# Patient Record
Sex: FEMALE | Race: OTHER | NOT HISPANIC OR LATINO
[De-identification: names, ages, dates, MRNs, and addresses within clinical notes are randomized per-mention and may not be internally consistent; named-entity substitution may affect disease eponyms.]

---

## 2020-11-14 RX ORDER — AZITHROMYCIN 500 MG/1
250 TABLET, FILM COATED ORAL
Qty: 0 | Refills: 0 | DISCHARGE
Start: 2020-11-14

## 2020-11-17 ENCOUNTER — TRANSCRIPTION ENCOUNTER (OUTPATIENT)
Age: 85
End: 2020-11-17

## 2020-11-17 ENCOUNTER — INPATIENT (INPATIENT)
Facility: HOSPITAL | Age: 85
LOS: 1 days | Discharge: HOME | DRG: 178 | End: 2020-11-19
Attending: INTERNAL MEDICINE | Admitting: INTERNAL MEDICINE
Payer: MEDICARE

## 2020-11-17 VITALS
WEIGHT: 160.06 LBS | OXYGEN SATURATION: 96 % | HEART RATE: 59 BPM | SYSTOLIC BLOOD PRESSURE: 152 MMHG | RESPIRATION RATE: 20 BRPM | HEIGHT: 69 IN | DIASTOLIC BLOOD PRESSURE: 75 MMHG | TEMPERATURE: 98 F

## 2020-11-17 DIAGNOSIS — E87.2 ACIDOSIS: ICD-10-CM

## 2020-11-17 DIAGNOSIS — U07.1 COVID-19: ICD-10-CM

## 2020-11-17 DIAGNOSIS — I26.99 OTHER PULMONARY EMBOLISM WITHOUT ACUTE COR PULMONALE: ICD-10-CM

## 2020-11-17 DIAGNOSIS — I25.10 ATHEROSCLEROTIC HEART DISEASE OF NATIVE CORONARY ARTERY WITHOUT ANGINA PECTORIS: ICD-10-CM

## 2020-11-17 DIAGNOSIS — M10.9 GOUT, UNSPECIFIED: ICD-10-CM

## 2020-11-17 DIAGNOSIS — Z29.9 ENCOUNTER FOR PROPHYLACTIC MEASURES, UNSPECIFIED: ICD-10-CM

## 2020-11-17 DIAGNOSIS — I10 ESSENTIAL (PRIMARY) HYPERTENSION: ICD-10-CM

## 2020-11-17 DIAGNOSIS — N17.9 ACUTE KIDNEY FAILURE, UNSPECIFIED: ICD-10-CM

## 2020-11-17 DIAGNOSIS — E78.5 HYPERLIPIDEMIA, UNSPECIFIED: ICD-10-CM

## 2020-11-17 LAB
ALBUMIN SERPL ELPH-MCNC: 4 G/DL — SIGNIFICANT CHANGE UP (ref 3.3–5)
ALP SERPL-CCNC: 61 U/L — SIGNIFICANT CHANGE UP (ref 40–120)
ALT FLD-CCNC: 23 U/L — SIGNIFICANT CHANGE UP (ref 10–45)
ANION GAP SERPL CALC-SCNC: 15 MMOL/L — SIGNIFICANT CHANGE UP (ref 5–17)
APTT BLD: 30.8 SEC — SIGNIFICANT CHANGE UP (ref 27.5–35.5)
AST SERPL-CCNC: 44 U/L — HIGH (ref 10–40)
BASE EXCESS BLDV CALC-SCNC: -1.9 MMOL/L — SIGNIFICANT CHANGE UP
BASOPHILS # BLD AUTO: 0.01 K/UL — SIGNIFICANT CHANGE UP (ref 0–0.2)
BASOPHILS NFR BLD AUTO: 0.2 % — SIGNIFICANT CHANGE UP (ref 0–2)
BILIRUB SERPL-MCNC: 0.4 MG/DL — SIGNIFICANT CHANGE UP (ref 0.2–1.2)
BUN SERPL-MCNC: 19 MG/DL — SIGNIFICANT CHANGE UP (ref 7–23)
CA-I SERPL-SCNC: 1.16 MMOL/L — SIGNIFICANT CHANGE UP (ref 1.12–1.3)
CALCIUM SERPL-MCNC: 9.5 MG/DL — SIGNIFICANT CHANGE UP (ref 8.4–10.5)
CHLORIDE SERPL-SCNC: 102 MMOL/L — SIGNIFICANT CHANGE UP (ref 96–108)
CO2 SERPL-SCNC: 20 MMOL/L — LOW (ref 22–31)
CREAT ?TM UR-MCNC: 56 MG/DL — SIGNIFICANT CHANGE UP
CREAT SERPL-MCNC: 1.34 MG/DL — HIGH (ref 0.5–1.3)
CRP SERPL-MCNC: 1.52 MG/DL — HIGH (ref 0–0.4)
D DIMER BLD IA.RAPID-MCNC: 490 NG/ML DDU — HIGH
EOSINOPHIL # BLD AUTO: 0 K/UL — SIGNIFICANT CHANGE UP (ref 0–0.5)
EOSINOPHIL NFR BLD AUTO: 0 % — SIGNIFICANT CHANGE UP (ref 0–6)
FERRITIN SERPL-MCNC: 308 NG/ML — HIGH (ref 15–150)
GAS PNL BLDV: 134 MMOL/L — LOW (ref 138–146)
GAS PNL BLDV: SIGNIFICANT CHANGE UP
GLUCOSE SERPL-MCNC: 100 MG/DL — HIGH (ref 70–99)
HCO3 BLDV-SCNC: 23 MMOL/L — SIGNIFICANT CHANGE UP (ref 20–27)
HCT VFR BLD CALC: 41.4 % — SIGNIFICANT CHANGE UP (ref 34.5–45)
HGB BLD-MCNC: 13.4 G/DL — SIGNIFICANT CHANGE UP (ref 11.5–15.5)
IMM GRANULOCYTES NFR BLD AUTO: 0.9 % — SIGNIFICANT CHANGE UP (ref 0–1.5)
INR BLD: 0.97 — SIGNIFICANT CHANGE UP (ref 0.88–1.16)
LACTATE SERPL-SCNC: 1.2 MMOL/L — SIGNIFICANT CHANGE UP (ref 0.5–2)
LYMPHOCYTES # BLD AUTO: 1.42 K/UL — SIGNIFICANT CHANGE UP (ref 1–3.3)
LYMPHOCYTES # BLD AUTO: 31.5 % — SIGNIFICANT CHANGE UP (ref 13–44)
MCHC RBC-ENTMCNC: 27.2 PG — SIGNIFICANT CHANGE UP (ref 27–34)
MCHC RBC-ENTMCNC: 32.4 GM/DL — SIGNIFICANT CHANGE UP (ref 32–36)
MCV RBC AUTO: 84.1 FL — SIGNIFICANT CHANGE UP (ref 80–100)
MONOCYTES # BLD AUTO: 0.49 K/UL — SIGNIFICANT CHANGE UP (ref 0–0.9)
MONOCYTES NFR BLD AUTO: 10.9 % — SIGNIFICANT CHANGE UP (ref 2–14)
NEUTROPHILS # BLD AUTO: 2.55 K/UL — SIGNIFICANT CHANGE UP (ref 1.8–7.4)
NEUTROPHILS NFR BLD AUTO: 56.5 % — SIGNIFICANT CHANGE UP (ref 43–77)
NRBC # BLD: 0 /100 WBCS — SIGNIFICANT CHANGE UP (ref 0–0)
PCO2 BLDV: 39 MMHG — LOW (ref 41–51)
PH BLDV: 7.38 — SIGNIFICANT CHANGE UP (ref 7.32–7.43)
PLATELET # BLD AUTO: 133 K/UL — LOW (ref 150–400)
PO2 BLDV: 36 MMHG — SIGNIFICANT CHANGE UP
POTASSIUM BLDV-SCNC: 3.7 MMOL/L — SIGNIFICANT CHANGE UP (ref 3.5–4.9)
POTASSIUM SERPL-MCNC: 4.2 MMOL/L — SIGNIFICANT CHANGE UP (ref 3.5–5.3)
POTASSIUM SERPL-SCNC: 4.2 MMOL/L — SIGNIFICANT CHANGE UP (ref 3.5–5.3)
PROT SERPL-MCNC: 7.9 G/DL — SIGNIFICANT CHANGE UP (ref 6–8.3)
PROTHROM AB SERPL-ACNC: 11.6 SEC — SIGNIFICANT CHANGE UP (ref 10.6–13.6)
RBC # BLD: 4.92 M/UL — SIGNIFICANT CHANGE UP (ref 3.8–5.2)
RBC # FLD: 13.6 % — SIGNIFICANT CHANGE UP (ref 10.3–14.5)
SAO2 % BLDV: 64 % — SIGNIFICANT CHANGE UP
SARS-COV-2 RNA SPEC QL NAA+PROBE: DETECTED
SODIUM SERPL-SCNC: 137 MMOL/L — SIGNIFICANT CHANGE UP (ref 135–145)
SODIUM UR-SCNC: 69 MMOL/L — SIGNIFICANT CHANGE UP
WBC # BLD: 4.51 K/UL — SIGNIFICANT CHANGE UP (ref 3.8–10.5)
WBC # FLD AUTO: 4.51 K/UL — SIGNIFICANT CHANGE UP (ref 3.8–10.5)

## 2020-11-17 PROCEDURE — 93010 ELECTROCARDIOGRAM REPORT: CPT

## 2020-11-17 PROCEDURE — 99285 EMERGENCY DEPT VISIT HI MDM: CPT | Mod: CS

## 2020-11-17 PROCEDURE — 71275 CT ANGIOGRAPHY CHEST: CPT | Mod: 26

## 2020-11-17 PROCEDURE — 99223 1ST HOSP IP/OBS HIGH 75: CPT | Mod: CS,GC,AI

## 2020-11-17 PROCEDURE — 71045 X-RAY EXAM CHEST 1 VIEW: CPT | Mod: 26

## 2020-11-17 RX ORDER — ALLOPURINOL 300 MG
100 TABLET ORAL DAILY
Refills: 0 | Status: DISCONTINUED | OUTPATIENT
Start: 2020-11-17 | End: 2020-11-19

## 2020-11-17 RX ORDER — INFLUENZA VIRUS VACCINE 15; 15; 15; 15 UG/.5ML; UG/.5ML; UG/.5ML; UG/.5ML
0.7 SUSPENSION INTRAMUSCULAR ONCE
Refills: 0 | Status: DISCONTINUED | OUTPATIENT
Start: 2020-11-17 | End: 2020-11-19

## 2020-11-17 RX ORDER — ASPIRIN/CALCIUM CARB/MAGNESIUM 324 MG
1 TABLET ORAL
Qty: 0 | Refills: 0 | DISCHARGE
Start: 2020-11-17

## 2020-11-17 RX ORDER — AMLODIPINE BESYLATE 2.5 MG/1
5 TABLET ORAL DAILY
Refills: 0 | Status: DISCONTINUED | OUTPATIENT
Start: 2020-11-17 | End: 2020-11-19

## 2020-11-17 RX ORDER — ROSUVASTATIN CALCIUM 5 MG/1
1 TABLET ORAL
Qty: 0 | Refills: 0 | DISCHARGE

## 2020-11-17 RX ORDER — ATORVASTATIN CALCIUM 80 MG/1
20 TABLET, FILM COATED ORAL AT BEDTIME
Refills: 0 | Status: DISCONTINUED | OUTPATIENT
Start: 2020-11-17 | End: 2020-11-19

## 2020-11-17 RX ORDER — INFLUENZA VIRUS VACCINE 15; 15; 15; 15 UG/.5ML; UG/.5ML; UG/.5ML; UG/.5ML
0.5 SUSPENSION INTRAMUSCULAR ONCE
Refills: 0 | Status: DISCONTINUED | OUTPATIENT
Start: 2020-11-17 | End: 2020-11-17

## 2020-11-17 RX ORDER — DEXAMETHASONE 0.5 MG/5ML
8 ELIXIR ORAL ONCE
Refills: 0 | Status: COMPLETED | OUTPATIENT
Start: 2020-11-17 | End: 2020-11-17

## 2020-11-17 RX ORDER — IPRATROPIUM BROMIDE 21 MCG
2 AEROSOL, SPRAY (ML) NASAL
Qty: 0 | Refills: 0 | DISCHARGE

## 2020-11-17 RX ORDER — AMLODIPINE BESYLATE 2.5 MG/1
1 TABLET ORAL
Qty: 0 | Refills: 0 | DISCHARGE

## 2020-11-17 RX ORDER — ENOXAPARIN SODIUM 100 MG/ML
40 INJECTION SUBCUTANEOUS EVERY 24 HOURS
Refills: 0 | Status: DISCONTINUED | OUTPATIENT
Start: 2020-11-17 | End: 2020-11-19

## 2020-11-17 RX ORDER — SODIUM CHLORIDE 9 MG/ML
500 INJECTION INTRAMUSCULAR; INTRAVENOUS; SUBCUTANEOUS ONCE
Refills: 0 | Status: COMPLETED | OUTPATIENT
Start: 2020-11-17 | End: 2020-11-17

## 2020-11-17 RX ORDER — ZOLPIDEM TARTRATE 10 MG/1
1 TABLET ORAL
Qty: 0 | Refills: 0 | DISCHARGE

## 2020-11-17 RX ORDER — ASPIRIN/CALCIUM CARB/MAGNESIUM 324 MG
81 TABLET ORAL DAILY
Refills: 0 | Status: DISCONTINUED | OUTPATIENT
Start: 2020-11-17 | End: 2020-11-19

## 2020-11-17 RX ORDER — ALLOPURINOL 300 MG
100 TABLET ORAL
Qty: 0 | Refills: 0 | DISCHARGE

## 2020-11-17 RX ADMIN — ATORVASTATIN CALCIUM 20 MILLIGRAM(S): 80 TABLET, FILM COATED ORAL at 22:29

## 2020-11-17 RX ADMIN — AMLODIPINE BESYLATE 5 MILLIGRAM(S): 2.5 TABLET ORAL at 12:11

## 2020-11-17 RX ADMIN — Medication 100 MILLIGRAM(S): at 12:11

## 2020-11-17 RX ADMIN — SODIUM CHLORIDE 500 MILLILITER(S): 9 INJECTION INTRAMUSCULAR; INTRAVENOUS; SUBCUTANEOUS at 10:17

## 2020-11-17 RX ADMIN — ENOXAPARIN SODIUM 40 MILLIGRAM(S): 100 INJECTION SUBCUTANEOUS at 12:11

## 2020-11-17 RX ADMIN — Medication 81 MILLIGRAM(S): at 19:55

## 2020-11-17 RX ADMIN — Medication 101.6 MILLIGRAM(S): at 09:34

## 2020-11-17 NOTE — ED PROVIDER NOTE - CARE PLAN
Principal Discharge DX:	COVID-19  Secondary Diagnosis:	Weakness  Secondary Diagnosis:	Acute kidney injury  Secondary Diagnosis:	Dehydration

## 2020-11-17 NOTE — H&P ADULT - PROBLEM SELECTOR PLAN 2
TIFFANY vs CKD vs TIFFANY on CKD vs normal physiology.  Patient is not reporting any history of kidney disease.  Her elevated creatine can potentially be due to an acute TIFFANY.  The TIFFANY is likely secondary to the patient having one weeks worth of decreased po intake and is pre-renal in origin. However there is no established baseline creatinine for this patient and the elevated creatinine may be secondary to aging and is physiologic in nature.   -Will encourage po intake  -Will consider IV fluids if patient is unable to tolerate po  -Trend creatinine   -Urine electrolytes ordered Patient complaining of shortness of breath, after being symptomatic with COVID for more than seven days.  Her D-dimer is elevated Patient complaining of shortness of breath, after being symptomatic with COVID-19 for more than seven days.  Her D-dimer is elevated which can be secondary to her current infection, however it is also seen in patients with PE. The prolonged course of SOB along with the hypercoagulable state COVID-19 creates warrants further workup to rule out a potential PE.   -CT with PE protocol

## 2020-11-17 NOTE — H&P ADULT - NSHPLABSRESULTS_GEN_ALL_CORE
LABS:                        13.4   4.51  )-----------( 133      ( 17 Nov 2020 07:52 )             41.4     11-17    137  |  102  |  19  ----------------------------<  100<H>  4.2   |  20<L>  |  1.34<H>    Ca    9.5      17 Nov 2020 07:52    TPro  7.9  /  Alb  4.0  /  TBili  0.4  /  DBili  x   /  AST  44<H>  /  ALT  23  /  AlkPhos  61  11-17    PT/INR - ( 17 Nov 2020 07:52 )   PT: 11.6 sec;   INR: 0.97          PTT - ( 17 Nov 2020 07:52 )  PTT:30.8 sec      RADIOLOGY & ADDITIONAL TESTS: Reviewed.

## 2020-11-17 NOTE — H&P ADULT - NSTELEHEALTH_GEN_ALL_CORE
Spoke with patient and she is aware that Dr. Hurtado will be out of the office week of 4/13, said she will drop off more forms next week (waiting for forms to come in the mail).     No

## 2020-11-17 NOTE — DISCHARGE NOTE PROVIDER - CARE PROVIDERS DIRECT ADDRESSES
,DirectAddress_Unknown ,DirectAddress_Unknown,[email or web address]

## 2020-11-17 NOTE — H&P ADULT - NSHPPHYSICALEXAM_GEN_ALL_CORE
PHYSICAL EXAM:    General: Lying in bed in non acute distress. Appears tired but is able to communicate well. She is alert and oriented times three. No increased work of breathing   HEENT: NC/AT; PERRL, clear conjunctiva, MMM  Neck: supple  Respiratory: Clear to ascultation bilaterally, no egophony, no nasal flaring, no costophrenic retractions   Cardiovascular: +S1/S2; RRR  Abdomen: soft, NT/ND; +BS x4, hyperactive bowel sounds  Extremities: WWP, 2+ peripheral pulses b/l; no LE edema  Skin: normal color and turgor; no rash  Neurological :CN 2-12 grossly intact

## 2020-11-17 NOTE — H&P ADULT - PROBLEM SELECTOR PLAN 4
As per medication review   -Continue Lipitor 20mg daily Patient with slightly decreased bicarbonate.  Anion gap is normal.  Acidosis likely secondary to diarrhea.   -Encourage po intake  -Continue to monitor As per Ms. Gamble's son patient has had stents placed into her coronary arteries years ago.  He is unable to provide additional information.   -Continue Lipitor 20mg daily    -Patient stated Dr. Patti Gamble will try to obtain collateral

## 2020-11-17 NOTE — H&P ADULT - ASSESSMENT
Patient is a 85 year old Nepali speaking female who recently tested positive for COVID-19 who presents with seven days of subjective fevers, shortness of breath, chills, night sweats, with a CXR illustrating bilateral opacities, saturating well on room air and is admitted for further observation of COVID-19.      Patient is a 85 year old Kiswahili speaking female who recently tested positive for COVID-19 who presents with nine days of subjective fevers, shortness of breath, chills, night sweats, with a CXR illustrating bilateral opacities, saturating well on room air and is admitted for further observation of COVID-19.

## 2020-11-17 NOTE — H&P ADULT - PROBLEM SELECTOR PLAN 3
As per Ms. Gamble's son patient has had stents placed into her coronary arteries years ago.  He is unable to provide additional information.   -Continue Lipitor 20mg daily    -Patient stated Dr. Patti Gamble will try to obtain collateral TIFFANY vs CKD vs TIFFANY on CKD vs normal physiology.  Patient is not reporting any history of kidney disease.  Her elevated creatine can potentially be due to an acute TIFFANY.  The TIFFANY is likely secondary to the patient having one weeks worth of decreased po intake and is pre-renal in origin. However there is no established baseline creatinine for this patient and the elevated creatinine may be secondary to aging and is physiologic in nature.   -Will encourage po intake  -Will consider IV fluids if patient is unable to tolerate po  -Trend creatinine   -Urine electrolytes ordered TIFFANY vs CKD vs TIFFANY on CKD vs normal physiology.  Patient is not reporting any history of kidney disease.  Her elevated creatine can potentially be due to an acute TIFFANY.  The TIFFANY is likely secondary to the patient having one weeks worth of decreased po intake and is pre-renal in origin.  However there is no established baseline creatinine for this patient and the elevated creatinine may be secondary to aging and is physiologic in nature.   -Will encourage po intake  -Will consider IV fluids if patient is unable to tolerate po  -Trend creatinine   -Urine electrolytes ordered

## 2020-11-17 NOTE — DISCHARGE NOTE PROVIDER - PROVIDER TOKENS
FREE:[LAST:[yodit edwards],FIRST:[srinivasa],PHONE:[phone]],FAX:[(   )    -],ADDRESS:[[address] [place] [address]],FOLLOWUP:[1 month]] FREE:[LAST:[yodit edwards],FIRST:[srinivasa],PHONE:[phone]],FAX:[(   )    -],ADDRESS:[[address] [place] [address]],SCHEDULEDAPPT:[12/10/2020],SCHEDULEDAPPTTIME:[03:20 PM]],PROVIDER:[TOKEN:[[id number]],SCHEDULEDAPPT:[12/09/2020],SCHEDULEDAPPTTIME:[10:00 AM]]

## 2020-11-17 NOTE — ED PROVIDER NOTE - CLINICAL SUMMARY MEDICAL DECISION MAKING FREE TEXT BOX
86 y/o F known +COVID here for weakness, decreased PO intake. Here rectally 99.8, sat 99% on room air. Blood work demonstrates mildly elevated D dimer at 490. CXR shows mild infiltrates consistent w/ mild PNA but not diffuse. Plan to hydrate 500 ccs fluid ordered, Dexamethasone for inflammation and will admit for weakness and decreased PO.

## 2020-11-17 NOTE — DISCHARGE NOTE PROVIDER - NSDCMRMEDTOKEN_GEN_ALL_CORE_FT
allopurinol 100 mg oral tablet: 100 milligram(s) orally once a day  amLODIPine 5 mg oral tablet: 1 tab(s) orally once a day  aspirin 81 mg oral delayed release tablet: 1 tab(s) orally once a day  ipratropium 21 mcg/inh (0.03%) nasal spray: 2 spray(s) nasal once a day (at bedtime), As Needed  rosuvastatin 20 mg oral tablet: 1 tab(s) orally once a day  zolpidem 10 mg oral tablet: 1 tab(s) orally once a day (at bedtime), As Needed   allopurinol 100 mg oral tablet: 100 milligram(s) orally once a day  amLODIPine 5 mg oral tablet: 1 tab(s) orally once a day  aspirin 81 mg oral delayed release tablet: 1 tab(s) orally once a day  ipratropium 21 mcg/inh (0.03%) nasal spray: 2 spray(s) nasal once a day (at bedtime), As Needed  rivaroxaban 10 mg oral tablet: 1 tab(s) orally once a day   rosuvastatin 20 mg oral tablet: 1 tab(s) orally once a day  zolpidem 10 mg oral tablet: 1 tab(s) orally once a day (at bedtime), As Needed

## 2020-11-17 NOTE — H&P ADULT - PROBLEM SELECTOR PLAN 6
As per medication review   -Continue Allopurinol 100mg daily As per medication review  -Continue Amlodipine 5mg daily As per medication review   -Continue Lipitor 20mg daily

## 2020-11-17 NOTE — H&P ADULT - PROBLEM SELECTOR PLAN 1
Patient tested positive for COVID-19 on 11/10.  She continues to have subjective fevers, chills, night sweats, and cough with productive sputum.  Chest X-ray shows opacification bilaterally which is consistent with COVID but could also possibly suggest pneumonia.  However pneumonia is less likely to be present in this patient as their is an absence of high fever, no crackles or egophony on physical exam, and her procalcitonin is not elevated. Patient received dexamethasone in ED and is currently saturating well on room air and is afebrile.   -Supportive care  -Will encourage po intake and avoid overhydrating patient  -Consider additional oxygen if needed  -Lovenox 40mg daily Patient tested positive for COVID-19 on 11/10.  She continues to have subjective fevers, chills, night sweats, cough with productive sputum, and shortness of breath.  Chest X-ray shows opacification bilaterally which is consistent with COVID but could also suggest pneumonia.  However pneumonia is less likely to be present in this patient as their is an absence of high fever, crackles, egophony on physical exam, and her procalcitonin is not elevated. Patient received dexamethasone in ED and is currently saturating well on room air and is afebrile.   -If patient becomes hypoxic (SpO2<92) consider steroids along with supplemental oxygen  -Supportive care  -Will encourage po intake and avoid overhydrating patient  -Lovenox 40mg daily

## 2020-11-17 NOTE — H&P ADULT - HISTORY OF PRESENT ILLNESS
Patient is a 85 year old Portuguese speaking female with a past medical history of hypertension, hyperlipidemia, CAD (stents placement, year and location unknown to patient), and gout presenting to ED with a chief complaint of, "weakness and fever."  Patient stated that her symptoms began a week ago.  At that point she began to have subjective fevers, night sweats, and chills.  The patient attributed the cause of her symptoms to COVID-19 as her  was diagnosed with it and was actively experiencing symptoms.  She then got tested at an urgent care and was positive for COVID-19 on November 10th.  Since then she continued to subjective fevers, dyspnea, lethargy, changes in sensation of smell, one episode of watery diarrhea, cough, sputum production (white and dime sized).  She comments that she has had a change in appetite since becoming ill, reulting in her eating and drinking less. She denies any chest pain, wheezing, chest tightness, nausea, or vomiting     ED:   Vitals: Afebrile, HR 56-63, -165/64-75, O2 Saturation 96-98% on Room Air  EKG: Sinus bradycardia   CXR: Bilateral alveolar opacities   Labs Significant for: WBC: 4.51, Platelet 133, D-Dimer 490, H2CO3 20, Cr 1.34, Ferritin 308, CRP 1.52, Procal: 0.1  Intervention: Given Dexamethasone 8mg once, 500cc NS Bolus once Patient is a 85 year old Divehi speaking female with a past medical history of hypertension, hyperlipidemia, CAD (stents placement, year and location unknown to patient), and gout presenting to ED with a chief complaint of, "weakness and fever."  Patient stated that her symptoms began a week ago.  At that point she began to have subjective fevers, night sweats, and chills.  The patient attributed the cause of her symptoms to COVID-19 as her  was diagnosed with it and was actively experiencing symptoms.  She then got tested at an urgent care and was positive for COVID-19 on November 10th.  Since then she continued to subjective fevers, dyspnea, lethargy, changes in sensation of smell, one episode of watery diarrhea, cough, sputum production (white and dime sized).  She comments that she has had a change in appetite since becoming ill, reulting in her eating and drinking less. She denies any chest pain, wheezing, chest tightness, nausea, or vomiting     ED:   Vitals: Afebrile, HR 56-63, -165/64-75, O2 Saturation 96-98% on Room Air  EKG: Sinus bradycardia   CXR: Bilateral alveolar opacities   Labs Significant for: WBC: 4.51, Platelet 133, D-Dimer 490, H2CO3 20, Cr 1.34, Ferritin 308, CRP 1.52, Procal: 0.1. VBG Ph:7.38, pCO2: 39 PO2: 36   Intervention: Given Dexamethasone 8mg once, 500cc NS Bolus once Patient is a 85 year old Maori speaking female with a past medical history of hypertension, hyperlipidemia, CAD (stents placement, year and location unknown to patient), and gout presenting to ED with a chief complaint of, "weakness and fever."  Patient stated that her symptoms began over a week ago.  At that point she began to have subjective fevers, night sweats, and chills.  The patient attributed the cause of her symptoms to COVID-19 as her  was diagnosed with it and was actively experiencing symptoms.  She then got tested at an urgent care and was positive for COVID-19 on November 10th.  Since then she continued to subjective fevers, dyspnea, lethargy, changes in sensation of smell, one episode of watery diarrhea, cough, sputum production (white and dime sized).  She comments that she has had a change in appetite since becoming ill, reulting in her eating and drinking less. She denies any chest pain, wheezing, chest tightness, nausea, or vomiting     ED:   Vitals: Afebrile, HR 56-63, -165/64-75, O2 Saturation 96-98% on Room Air  EKG: Sinus bradycardia   CXR: Bilateral alveolar opacities   Labs Significant for: WBC: 4.51, Platelet 133, D-Dimer 490, H2CO3 20, Cr 1.34, Ferritin 308, CRP 1.52, Procal: 0.1. VBG Ph:7.38, pCO2: 39 PO2: 36   Intervention: Given Dexamethasone 8mg once, 500cc NS Bolus once

## 2020-11-17 NOTE — H&P ADULT - PROBLEM SELECTOR PLAN 9
Fluids: Given 500 CC bolus once   Electrolytes: Potassium greater than 2 and Mg greater than 4, (Replete with discretion patient could have CKD)  Nutrition: Dash  GI Prophylaxis: None

## 2020-11-17 NOTE — ED ADULT NURSE REASSESSMENT NOTE - NS ED NURSE REASSESS COMMENT FT1
Family contact info:     Pt's  (Uri Gamble): Home [phone] Mobile [phone]  Pt's son (Luigi Gamble): [phone]

## 2020-11-17 NOTE — H&P ADULT - PROBLEM SELECTOR PLAN 8
Fluids: Given 500 CC bolus once   Electrolytes: Potassium greater than 2 and Mg greater than 4, (Replete with discretion patient could have CKD)  Nutrition: Dash  GI Prophylaxis: None As per medication review   -Continue Allopurinol 100mg daily

## 2020-11-17 NOTE — DISCHARGE NOTE PROVIDER - NSDCCPCAREPLAN_GEN_ALL_CORE_FT
PRINCIPAL DISCHARGE DIAGNOSIS  Diagnosis: COVID-19  Assessment and Plan of Treatment: You had signs and symptoms concerning for COVID-19. You tested postive.  You have been clinically stable and deemed safe for discharge home to continue your treatment course and quarantine.  While you are at home you should stay in your own room whenever possible and wear a mask as much as possible. If you have to be in the same room as someone else, you should both wear a mask. If you share a restroom, you should wipe it down with bleach wipes between each use.Please continue to practice social distancing and good hand hygiene.  You need to quarantine for 14 days after your positive test result.  You will also have a pulse oximeter device to help you monitor your oxygen levels and heart rate.   If you do experience worsening shortness of breath at home, start to experience new chest pain or new leg pain, please call your doctor and consider coming back        SECONDARY DISCHARGE DIAGNOSES  Diagnosis: Dehydration  Assessment and Plan of Treatment: You were dehydrated when you came in. To prevent this please try your best to intake more fluids such as water, soup, fruit juices, etc    Diagnosis: Weakness  Assessment and Plan of Treatment: You felt weak when you came into the emergency room. This is probably from a combination of the virus and your dehydration. Please continue to drink fluids. The virus has had varying courses for fatigue and weakness. Although most people do not feel fatigued anymore after a week or two, some people say they have had prolonged weakness last a few months. Please follow-up with your primary care provider.    Diagnosis: HTN (hypertension)  Assessment and Plan of Treatment: Hypertension is the medical term for high blood pressure. Blood pressure refers to the pressure that blood applies to the inner walls of the arteries. Arteries carry blood from the heart to other organs and parts of the body. Untreated high blood pressure increases the strain on the heart and arteries, eventually causing organ damage. High blood pressure increases the risk of heart failure, heart attack (myocardial infarction), stroke, and kidney failure. High blood pressure does not usually cause any symptoms. Treatment of hypertension usually begins with lifestyle changes. Making these lifestyle changes involves little or no risk. Recommended changes often include reducing the amount of salt in your diet, losing weight if you are overweight or obese, avoiding drinking too much alcohol, stopping smoking and exercising at least 30 minutes per day most days of the week. If you are prescribed medication for your hypertension it is important to take these as prescribed to prevent the possible complications of uncontrolled hypertension. please continue amlodipine 5mg everyday      Diagnosis: Gout  Assessment and Plan of Treatment: Gout is a form of arthritis caused by excess uric acid in the bloodstream. The symptoms of gout are due to the formation of uric acid crystals in the joints and the body's response to them. Gout most classically affects the joint in the base of the big toe. Please continue to take your allopurinol 100mg everyday      Diagnosis: Coronary disease  Assessment and Plan of Treatment: You have a history of coronary artery disease. This is damage or disease in the heart's major blood vessels. The usual cause is the buildup of plaque. This causes coronary arteries to narrow, limiting blood flow to the heart. Coronary artery disease can range from no symptoms, to chest pain, to a heart attack. Please continue to take your asprin 81mg everyday and atorvastatin 20mg before bed       PRINCIPAL DISCHARGE DIAGNOSIS  Diagnosis: COVID-19  Assessment and Plan of Treatment: You had signs and symptoms concerning for COVID-19. You tested postive.  You have been clinically stable and deemed safe for discharge home to continue your treatment course and quarantine.  While you are at home you should stay in your own room whenever possible and wear a mask as much as possible. If you have to be in the same room as someone else, you should both wear a mask. If you share a restroom, you should wipe it down with bleach wipes between each use.Please continue to practice social distancing and good hand hygiene.  You need to quarantine for 14 days after your positive test result.  You will also have a pulse oximeter device to help you monitor your oxygen levels and heart rate.   If you do experience worsening shortness of breath at home, start to experience new chest pain or new leg pain, please call your doctor and consider coming back.  Due to the risk of blood clots with covid-19 we have started you on a 30 day course of a blood thinner. Please take one pill per day for the next  30 days and follow-up with our Dr. Tirado december 9th at 10 am  and your primary care Dr. Josiah Gamble on December 10th at 3:20PM        SECONDARY DISCHARGE DIAGNOSES  Diagnosis: Dehydration  Assessment and Plan of Treatment: You were dehydrated when you came in. To prevent this please try your best to intake more fluids such as water, soup, fruit juices, etc    Diagnosis: Weakness  Assessment and Plan of Treatment: You felt weak when you came into the emergency room. This is probably from a combination of the virus and your dehydration. Please continue to drink fluids. The virus has had varying courses for fatigue and weakness. Although most people do not feel fatigued anymore after a week or two, some people say they have had prolonged weakness last a few months. Please follow-up with your primary care provider.    Diagnosis: HTN (hypertension)  Assessment and Plan of Treatment: Hypertension is the medical term for high blood pressure. Blood pressure refers to the pressure that blood applies to the inner walls of the arteries. Arteries carry blood from the heart to other organs and parts of the body. Untreated high blood pressure increases the strain on the heart and arteries, eventually causing organ damage. High blood pressure increases the risk of heart failure, heart attack (myocardial infarction), stroke, and kidney failure. High blood pressure does not usually cause any symptoms. Treatment of hypertension usually begins with lifestyle changes. Making these lifestyle changes involves little or no risk. Recommended changes often include reducing the amount of salt in your diet, losing weight if you are overweight or obese, avoiding drinking too much alcohol, stopping smoking and exercising at least 30 minutes per day most days of the week. If you are prescribed medication for your hypertension it is important to take these as prescribed to prevent the possible complications of uncontrolled hypertension. please continue amlodipine 5mg everyday      Diagnosis: Gout  Assessment and Plan of Treatment: Gout is a form of arthritis caused by excess uric acid in the bloodstream. The symptoms of gout are due to the formation of uric acid crystals in the joints and the body's response to them. Gout most classically affects the joint in the base of the big toe. Please continue to take your allopurinol 100mg everyday      Diagnosis: Coronary disease  Assessment and Plan of Treatment: You have a history of coronary artery disease. This is damage or disease in the heart's major blood vessels. The usual cause is the buildup of plaque. This causes coronary arteries to narrow, limiting blood flow to the heart. Coronary artery disease can range from no symptoms, to chest pain, to a heart attack. Please continue to take your asprin 81mg everyday and atorvastatin 20mg before bed

## 2020-11-17 NOTE — H&P ADULT - ATTENDING COMMENTS
Patient discussed with resident team and plan of care reviewed. I have personally reviewed all pertinent labs and imaging and performed an independent history and physical. Resident note personally reviewed, and I agree with above resident note with the following additions:    86YO Turkish speaking female with history of essential HTN, gout, HLD, CAD s/p PCI admitted for one week of persistent fever, fatigue, and poor oral intake in the setting of COVID19 infection (first COVID+ on Nov 10). Also with elevated creatinine 1.3, suspected acute kidney injury.    Patient says she feels a little better after a dose of steroids and IVF in the ED. She is not a candidate at this time for remdesivir/dexamethasone given normal SpO2 (mid-high 90s on room air).  -s/p IVF in ED, hold off on further IVF for now and encourage PO intake. Recheck in am.  -agree with checking CTA chest given elevated age-adjusted D-dimer  -not candidate for remdesivir or dexamethasone. Can start if becomes hypoxemic

## 2020-11-17 NOTE — ED ADULT NURSE NOTE - OBJECTIVE STATEMENT
hx of COVID (+), son called 911 as pt was feeling weak and febrile TMax of 101 at home  pt. mainly reporting decreased p.o. intake and profound generalized weakness, over last week

## 2020-11-17 NOTE — PROGRESS NOTE ADULT - ASSESSMENT
· Assessment	  Patient is a 85 year old Mongolian speaking female who recently tested positive for COVID-19 who presents with nine days of subjective fevers, shortness of breath, chills, night sweats, with a CXR illustrating bilateral opacities, saturating well on room air and is admitted for further observation of COVID-19.        Problem/Plan - 1: COVID-19; Positive 11/10  - s/p dex 8mg in ED  - cxray w/ bilateral opacities  - Procal neg    - If patient becomes hypoxic (SpO2<92) consider steroids along with supplemental oxygen  - Will encourage po intake and avoid overhydrating patient  - Lovenox 40mg daily.      Problem/Plan - 2: R/O Pulmonary embolism: SOB in hypercoag state  - elevated d-dimer   - c/w lovenox 40mg qd  - f/u CT with PE protocol.      Problem/Plan - 3: CKD: Cr 1.34 on admission, baseline 1.2  - per PCP baseline from 5685-1312 is 1.2  - no acute intervention     Problem/Plan - 4: Coronary disease s/p stent years ago (from son)   - Continue Lipitor 20mg daily, c/w ASA     Problem/Plan - 5: Metabolic acidosis: anion gap WNL most likly 2/2 to diarrhea will  - Encourage po intake     Problem/Plan - 6: HLD (hyperlipidemia)  - C/w Lipitor 20mg daily.     Problem/Plan - 7: HTN (hypertension)  - c/w Amlodipine 5mg daily.      Problem/Plan - 8: Gout  -C/w Allopurinol 100mg daily.      Problem/Plan - 9: Preventive measure  Fluids: Given 500 CC bolus once   DVT lovenox 40 qd  Nutrition: Dash  GI Prophylaxis: None.

## 2020-11-17 NOTE — H&P ADULT - PROBLEM SELECTOR PLAN 7
Fluids: Given 500 CC bolus once   Electrolytes: Potassium greater than 2 and Mg greater than 4, (Replete with discretion patient could have CKD)  Nutrition: Dash  GI Prophylaxis: None As per medication review   -Continue Allopurinol 100mg daily As per medication review  -Continue Amlodipine 5mg daily

## 2020-11-17 NOTE — DISCHARGE NOTE PROVIDER - HOSPITAL COURSE
#discharge do not delete    Patient is a 85 year old Portuguese speaking female with a past medical history of hypertension, hyperlipidemia, CAD (stents placement, year and location unknown to patient), and gout presenting to ED with a chief complaint of, "weakness and fever."  Patient stated that her symptoms began over a week ago when she tested positive for covid.  At that point she began to have subjective fevers, night sweats, and chills. Came to ED for worsening symptoms and found to be COVID positive with good saturations on RA. Patient main concern was decrease in PO intake. Patient admitted and monitored for desaturation. Had CT PE due to mildly elevated d-dimer which returned negative. Patient continued to saturate well never requring O2 therapy and was deemed stable for DC       Problem/Plan - 1: COVID-19; Positive 11/10  - s/p dex 8mg in ED  - If patient becomes hypoxic (SpO2<92) consider steroids along with supplemental oxygen  - Lovenox 40mg daily.      Problem/Plan - 2: R/O Pulmonary embolism: SOB in hypercoag state  - CT with PE protocol negative for PE     Problem/Plan - 3: CKD: Cr 1.34 on admission, baseline 1.2  - per PCP baseline from 3168-1137 is 1.2  - no acute intervention     Problem/Plan - 4: Coronary disease s/p stent years ago (from son)   - Lipitor 20mg daily, c/w ASA     Problem/Plan - 5: Metabolic acidosis: anion gap WNL most likly 2/2 to diarrhea will  - Encourage po intake     Problem/Plan - 6: HLD (hyperlipidemia)  - C/w Lipitor 20mg daily.     Problem/Plan - 7: HTN (hypertension)  - c/w Amlodipine 5mg daily.      Problem/Plan - 8: Gout  -C/w Allopurinol 100mg daily.     New meds: none    New images to follow-up:none    New labs: none   #discharge do not delete    Patient is a 85 year old Georgian speaking female with a past medical history of hypertension, hyperlipidemia, CAD (stents placement, year and location unknown to patient), and gout presenting to ED with a chief complaint of, "weakness and fever."  Patient stated that her symptoms began over a week ago when she tested positive for covid.  At that point she began to have subjective fevers, night sweats, and chills. Came to ED for worsening symptoms and found to be COVID positive with good saturations on RA. Patient main concern was decrease in PO intake. Patient admitted and monitored for desaturation. Had CT PE due to mildly elevated d-dimer which returned negative. Patient continued to saturate well never requring O2 therapy and was deemed stable for DC       Problem/Plan - 1: COVID-19; Positive 11/10  - s/p dex 8mg in ED  - If patient becomes hypoxic (SpO2<92) consider steroids along with supplemental oxygen  - Lovenox 40mg daily then xarelto 10mg qd 30 days     Problem/Plan - 2: R/O Pulmonary embolism: SOB in hypercoag state  - CT with PE protocol negative for PE     Problem/Plan - 3: CKD: Cr 1.34 on admission, baseline 1.2  - per PCP baseline from 3638-3439 is 1.2  - no acute intervention     Problem/Plan - 4: Coronary disease s/p stent years ago (from son)   - Lipitor 20mg daily, c/w ASA     Problem/Plan - 5: Metabolic acidosis: anion gap WNL most likly 2/2 to diarrhea will  - Encourage po intake     Problem/Plan - 6: HLD (hyperlipidemia)  - C/w Lipitor 20mg daily.     Problem/Plan - 7: HTN (hypertension)  - c/w Amlodipine 5mg daily.      Problem/Plan - 8: Gout  -C/w Allopurinol 100mg daily.     New meds: Xarelto 10mg qd 30 days    New images to follow-up:none    New labs: none

## 2020-11-17 NOTE — H&P ADULT - PROBLEM SELECTOR PLAN 5
As per medication review  -Continue Amlodipine 5mg daily As per medication review   -Continue Lipitor 20mg daily Patient with slightly decreased bicarbonate.  Anion gap is normal.  Acidosis likely secondary to diarrhea.   -Encourage po intake  -Continue to monitor

## 2020-11-17 NOTE — H&P ADULT - NSICDXPASTMEDICALHX_GEN_ALL_CORE_FT
PAST MEDICAL HISTORY:  Coronary disease     Gout     HLD (hyperlipidemia)     HTN (hypertension)

## 2020-11-17 NOTE — ED ADULT TRIAGE NOTE - DOMESTIC TRAVEL HIGH RISK QUESTION
Problem: Postpartum  Goal: Demonstrates progression toward physical  / emotional / psychosocial postpartum recovery    Intervention: Maintain supportive environment to promote rest, transition and recovery  Ongoing      Goal: Demonstrates positive reciprocal attachment with infant    Intervention: Support mom and family in providing infant cares  Ongoing    Intervention: Minimize separation events for mom/ baby  Ongoing          
No

## 2020-11-17 NOTE — DISCHARGE NOTE PROVIDER - CARE PROVIDER_API CALL
srinivasa robison  [address] [place] [address]  Phone: [phone]  Fax: (   )    -  Follow Up Time: 1 month   srinivasa robison  [address] [place] [address]  Phone: [phone]  Fax: (   )    -  Scheduled Appointment: 12/10/2020 03:20 PM    Mary Ellen Tirado  CARDIOLOGY  [address] [place] [address]  Phone: [phone]  Fax: [phone]  Scheduled Appointment: 12/09/2020 10:00 AM

## 2020-11-17 NOTE — ED ADULT NURSE REASSESSMENT NOTE - NS ED NURSE REASSESS COMMENT FT1
interventions as noted, richie. well, assessment on-going, awaiting results/further orders, pt. utd on current poc, with understanding verbalized

## 2020-11-17 NOTE — ED ADULT NURSE NOTE - NSIMPLEMENTINTERV_GEN_ALL_ED
Implemented All Fall Risk Interventions:  [place] to call system. Call bell, personal items and telephone within reach. Instruct patient to call for assistance. Room bathroom lighting operational. Non-slip footwear when patient is off stretcher. Physically safe environment: no spills, clutter or unnecessary equipment. Stretcher in lowest position, wheels locked, appropriate side rails in place. Provide visual cue, wrist band, yellow gown, etc. Monitor gait and stability. Monitor for mental status changes and reorient to person, place, and time. Review medications for side effects contributing to fall risk. Reinforce activity limits and safety measures with patient and family.

## 2020-11-18 LAB
ALBUMIN SERPL ELPH-MCNC: 3.9 G/DL — SIGNIFICANT CHANGE UP (ref 3.3–5)
ALP SERPL-CCNC: 62 U/L — SIGNIFICANT CHANGE UP (ref 40–120)
ALT FLD-CCNC: 22 U/L — SIGNIFICANT CHANGE UP (ref 10–45)
ANION GAP SERPL CALC-SCNC: 9 MMOL/L — SIGNIFICANT CHANGE UP (ref 5–17)
AST SERPL-CCNC: 29 U/L — SIGNIFICANT CHANGE UP (ref 10–40)
BILIRUB SERPL-MCNC: 0.4 MG/DL — SIGNIFICANT CHANGE UP (ref 0.2–1.2)
BUN SERPL-MCNC: 22 MG/DL — SIGNIFICANT CHANGE UP (ref 7–23)
CALCIUM SERPL-MCNC: 9.6 MG/DL — SIGNIFICANT CHANGE UP (ref 8.4–10.5)
CHLORIDE SERPL-SCNC: 111 MMOL/L — HIGH (ref 96–108)
CO2 SERPL-SCNC: 21 MMOL/L — LOW (ref 22–31)
CREAT SERPL-MCNC: 1.23 MG/DL — SIGNIFICANT CHANGE UP (ref 0.5–1.3)
CRP SERPL-MCNC: 1.84 MG/DL — HIGH (ref 0–0.4)
D DIMER BLD IA.RAPID-MCNC: 472 NG/ML DDU — HIGH
FERRITIN SERPL-MCNC: 333 NG/ML — HIGH (ref 15–150)
GLUCOSE SERPL-MCNC: 142 MG/DL — HIGH (ref 70–99)
HCT VFR BLD CALC: 40.1 % — SIGNIFICANT CHANGE UP (ref 34.5–45)
HGB BLD-MCNC: 12.9 G/DL — SIGNIFICANT CHANGE UP (ref 11.5–15.5)
MAGNESIUM SERPL-MCNC: 2.2 MG/DL — SIGNIFICANT CHANGE UP (ref 1.6–2.6)
MCHC RBC-ENTMCNC: 26.7 PG — LOW (ref 27–34)
MCHC RBC-ENTMCNC: 32.2 GM/DL — SIGNIFICANT CHANGE UP (ref 32–36)
MCV RBC AUTO: 82.9 FL — SIGNIFICANT CHANGE UP (ref 80–100)
NRBC # BLD: 0 /100 WBCS — SIGNIFICANT CHANGE UP (ref 0–0)
PHOSPHATE SERPL-MCNC: 3.4 MG/DL — SIGNIFICANT CHANGE UP (ref 2.5–4.5)
PLATELET # BLD AUTO: 156 K/UL — SIGNIFICANT CHANGE UP (ref 150–400)
POTASSIUM SERPL-MCNC: 4.4 MMOL/L — SIGNIFICANT CHANGE UP (ref 3.5–5.3)
POTASSIUM SERPL-SCNC: 4.4 MMOL/L — SIGNIFICANT CHANGE UP (ref 3.5–5.3)
PROT SERPL-MCNC: 7.4 G/DL — SIGNIFICANT CHANGE UP (ref 6–8.3)
RBC # BLD: 4.84 M/UL — SIGNIFICANT CHANGE UP (ref 3.8–5.2)
RBC # FLD: 13.9 % — SIGNIFICANT CHANGE UP (ref 10.3–14.5)
SODIUM SERPL-SCNC: 141 MMOL/L — SIGNIFICANT CHANGE UP (ref 135–145)
WBC # BLD: 8.85 K/UL — SIGNIFICANT CHANGE UP (ref 3.8–10.5)
WBC # FLD AUTO: 8.85 K/UL — SIGNIFICANT CHANGE UP (ref 3.8–10.5)

## 2020-11-18 PROCEDURE — 99233 SBSQ HOSP IP/OBS HIGH 50: CPT | Mod: CS,GC

## 2020-11-18 RX ORDER — PANTOPRAZOLE SODIUM 20 MG/1
40 TABLET, DELAYED RELEASE ORAL
Refills: 0 | Status: DISCONTINUED | OUTPATIENT
Start: 2020-11-18 | End: 2020-11-19

## 2020-11-18 RX ORDER — ACETAMINOPHEN 500 MG
650 TABLET ORAL ONCE
Refills: 0 | Status: COMPLETED | OUTPATIENT
Start: 2020-11-18 | End: 2020-11-18

## 2020-11-18 RX ADMIN — ENOXAPARIN SODIUM 40 MILLIGRAM(S): 100 INJECTION SUBCUTANEOUS at 12:23

## 2020-11-18 RX ADMIN — PANTOPRAZOLE SODIUM 40 MILLIGRAM(S): 20 TABLET, DELAYED RELEASE ORAL at 17:54

## 2020-11-18 RX ADMIN — Medication 81 MILLIGRAM(S): at 12:23

## 2020-11-18 RX ADMIN — ATORVASTATIN CALCIUM 20 MILLIGRAM(S): 80 TABLET, FILM COATED ORAL at 22:06

## 2020-11-18 RX ADMIN — AMLODIPINE BESYLATE 5 MILLIGRAM(S): 2.5 TABLET ORAL at 06:00

## 2020-11-18 RX ADMIN — Medication 100 MILLIGRAM(S): at 12:21

## 2020-11-18 RX ADMIN — Medication 650 MILLIGRAM(S): at 22:05

## 2020-11-18 NOTE — PROGRESS NOTE ADULT - ASSESSMENT
Patient is a 85 year old Italian speaking female who recently tested positive for COVID-19 who presents with nine days of subjective fevers, shortness of breath, chills, night sweats, with a CXR illustrating bilateral opacities, saturating well on room air and is admitted for further observation of COVID-19.        Problem/Plan - 1: COVID-19; Positive 11/10  - s/p dex 8mg in ED  - cxray w/ bilateral opacities  - Procal neg    - If patient becomes hypoxic (SpO2<92) consider steroids along with supplemental oxygen  - Will encourage po intake and avoid overhydrating patient  - Lovenox 40mg daily.      Problem/Plan - 2: R/O Pulmonary embolism: SOB in hypercoag state  - elevated d-dimer   - c/w lovenox 40mg qd  - CT with PE protocol negative for PE     Problem/Plan - 3: CKD: Cr 1.34 on admission, baseline 1.2  - per PCP baseline from 3123-1685 is 1.2  - no acute intervention     Problem/Plan - 4: Coronary disease s/p stent years ago (from son)   - Continue Lipitor 20mg daily, c/w ASA     Problem/Plan - 5: Metabolic acidosis: anion gap WNL most likly 2/2 to diarrhea will  - Encourage po intake     Problem/Plan - 6: HLD (hyperlipidemia)  - C/w Lipitor 20mg daily.     Problem/Plan - 7: HTN (hypertension)  - c/w Amlodipine 5mg daily.      Problem/Plan - 8: Gout  -C/w Allopurinol 100mg daily.      Problem/Plan - 9: weakness:  - PT consult     Problem/Plan - 9: Preventive measure  Fluids: Given 500 CC bolus once   DVT lovenox 40 qd  Nutrition: Dash  GI Prophylaxis: None.        Patient is a 85 year old Ukrainian speaking female who recently tested positive for COVID-19 who presents with nine days of subjective fevers, shortness of breath, chills, night sweats, with a CXR illustrating bilateral opacities, saturating well on room air and is admitted for further observation of COVID-19.        Problem/Plan - 1: COVID-19; Positive 11/10  - s/p dex 8mg in ED  - cxray w/ bilateral opacities  - Procal neg    - If patient becomes hypoxic (SpO2<92) consider steroids along with supplemental oxygen  - Will encourage po intake and avoid overhydrating patient  - Lovenox 40mg daily.      Problem/Plan -2: epigastric pain; unclear eitology, decreased PO intake 1wk  - protonix 40 qd     Problem/Plan - 3: R/O Pulmonary embolism: SOB in hypercoag state  - elevated d-dimer   - c/w lovenox 40mg qd  - CT with PE protocol negative for PE     Problem/Plan - 4: CKD: Cr 1.34 on admission, baseline 1.2  - per PCP baseline from 0858-3249 is 1.2  - no acute intervention     Problem/Plan - 5: Coronary disease s/p stent years ago (from son)   - Continue Lipitor 20mg daily, c/w ASA     Problem/Plan - 6: Metabolic acidosis: anion gap WNL most likly 2/2 to diarrhea will  - Encourage po intake     Problem/Plan - 7: HLD (hyperlipidemia)  - C/w Lipitor 20mg daily.     Problem/Plan - 8: HTN (hypertension)  - c/w Amlodipine 5mg daily.      Problem/Plan - 9: Gout  -C/w Allopurinol 100mg daily.      Problem/Plan - 10: weakness:  - PT consult     Problem/Plan - 11: Preventive measure  Fluids: Given 500 CC bolus once   DVT lovenox 40 qd  Nutrition: Dash  GI Prophylaxis: None.

## 2020-11-18 NOTE — PROGRESS NOTE ADULT - ATTENDING COMMENTS
Patient discussed with resident team and plan of care reviewed. I have personally reviewed all pertinent labs and imaging and performed an independent history and physical. Resident note personally reviewed, and I agree with above resident note with the following additions:    84YO Faroese speaking female with history of essential HTN, gout, HLD, CKD3 (baseline ~1.2), CAD s/p PCI admitted for one week of persistent fever, fatigue, and poor oral intake in the setting of COVID19 infection (first COVID+ on Nov 10).    Patient feels a little better today though still very fatigued/generalized weakness. Not requiring any oxygen. CRP increased slightly from yesterday though overall pretty low to begin with (1.5->1.8). She is not a candidate at this time for remdesivir/dexamethasone given normal SpO2 (mid-high 90s on room air). CTA negative for PE, notably for bilateral GGOs consistent with COVID19.   -continue supportive care  -will order PT  -monitor clinical status today, likely home tomorrow

## 2020-11-18 NOTE — PROGRESS NOTE ADULT - SUBJECTIVE AND OBJECTIVE BOX
INTERVAL HPI/OVERNIGHT EVENTS: STEVEN    SUBJECTIVE:   Patient seen and examined at bedside. says she feels alittle bit better but mostly because her cough is reduced. she also complained of mild epiastric pain which has been occuring for the last 5-6 days. no chest pain, SOB, changes in bowl or bladder movements    OBJECTIVE:    VITAL SIGNS:  ICU Vital Signs Last 24 Hrs  T(C): 36 (18 Nov 2020 10:00), Max: 37.1 (17 Nov 2020 21:06)  T(F): 96.8 (18 Nov 2020 10:00), Max: 98.7 (17 Nov 2020 21:06)  HR: 53 (18 Nov 2020 10:00) (53 - 66)  BP: 142/66 (18 Nov 2020 10:00) (130/69 - 150/68)  RR: 18 (18 Nov 2020 10:00) (16 - 19)  SpO2: 96% (18 Nov 2020 10:00) (94% - 96%)      PHYSICAL EXAM:    General: NAD  HEENT: NC/AT, clear conjunctiva  Neck: Supple.  Respiratory: CTA b/l. No wheezes, rhonchi, rales.  Cardiovascular: +S1/S2; RRR  Abdomen: soft, tender to deep palpation of epigastrium/ND; +BS x4  Extremities: WWP, 2+ peripheral pulses b/l; no LE edema  Skin: normal color and turgor; no rash  Neurological:     MEDICATIONS:  MEDICATIONS  (STANDING):  allopurinol 100 milliGRAM(s) Oral daily  amLODIPine   Tablet 5 milliGRAM(s) Oral daily  aspirin enteric coated 81 milliGRAM(s) Oral daily  atorvastatin 20 milliGRAM(s) Oral at bedtime  enoxaparin Injectable 40 milliGRAM(s) SubCutaneous every 24 hours  influenza  Vaccine (HIGH DOSE) 0.7 milliLiter(s) IntraMuscular once    MEDICATIONS  (PRN):          LABS:                        12.9   8.85  )-----------( 156      ( 18 Nov 2020 08:44 )             40.1     11-18    141  |  111<H>  |  22  ----------------------------<  142<H>  4.4   |  21<L>  |  1.23    Ca    9.6      18 Nov 2020 08:44  Phos  3.4     11-18  Mg     2.2     11-18    TPro  7.4  /  Alb  3.9  /  TBili  0.4  /  DBili  x   /  AST  29  /  ALT  22  /  AlkPhos  62  11-18    PT/INR - ( 17 Nov 2020 07:52 )   PT: 11.6 sec;   INR: 0.97          PTT - ( 17 Nov 2020 07:52 )  PTT:30.8 sec      RADIOLOGY & ADDITIONAL TESTS: Reviewed.

## 2020-11-19 ENCOUNTER — TRANSCRIPTION ENCOUNTER (OUTPATIENT)
Age: 85
End: 2020-11-19

## 2020-11-19 VITALS — TEMPERATURE: 100 F

## 2020-11-19 PROBLEM — I10 ESSENTIAL (PRIMARY) HYPERTENSION: Chronic | Status: ACTIVE | Noted: 2020-11-17

## 2020-11-19 PROBLEM — I10 ESSENTIAL (PRIMARY) HYPERTENSION: Chronic | Status: ACTIVE | Noted: 2020-11-18

## 2020-11-19 PROBLEM — U07.1 COVID-19: Chronic | Status: ACTIVE | Noted: 2020-11-18

## 2020-11-19 PROBLEM — M10.9 GOUT, UNSPECIFIED: Chronic | Status: ACTIVE | Noted: 2020-11-17

## 2020-11-19 PROBLEM — I25.10 ATHEROSCLEROTIC HEART DISEASE OF NATIVE CORONARY ARTERY WITHOUT ANGINA PECTORIS: Chronic | Status: ACTIVE | Noted: 2020-11-17

## 2020-11-19 PROBLEM — E78.5 HYPERLIPIDEMIA, UNSPECIFIED: Chronic | Status: ACTIVE | Noted: 2020-11-17

## 2020-11-19 PROBLEM — E78.5 HYPERLIPIDEMIA, UNSPECIFIED: Chronic | Status: ACTIVE | Noted: 2020-11-18

## 2020-11-19 LAB
ALBUMIN SERPL ELPH-MCNC: 3.5 G/DL — SIGNIFICANT CHANGE UP (ref 3.3–5)
ALP SERPL-CCNC: 58 U/L — SIGNIFICANT CHANGE UP (ref 40–120)
ALT FLD-CCNC: 16 U/L — SIGNIFICANT CHANGE UP (ref 10–45)
ANION GAP SERPL CALC-SCNC: 12 MMOL/L — SIGNIFICANT CHANGE UP (ref 5–17)
AST SERPL-CCNC: 28 U/L — SIGNIFICANT CHANGE UP (ref 10–40)
BILIRUB SERPL-MCNC: 0.4 MG/DL — SIGNIFICANT CHANGE UP (ref 0.2–1.2)
BUN SERPL-MCNC: 21 MG/DL — SIGNIFICANT CHANGE UP (ref 7–23)
CALCIUM SERPL-MCNC: 9 MG/DL — SIGNIFICANT CHANGE UP (ref 8.4–10.5)
CHLORIDE SERPL-SCNC: 104 MMOL/L — SIGNIFICANT CHANGE UP (ref 96–108)
CO2 SERPL-SCNC: 19 MMOL/L — LOW (ref 22–31)
CREAT SERPL-MCNC: 1.28 MG/DL — SIGNIFICANT CHANGE UP (ref 0.5–1.3)
CRP SERPL-MCNC: 2.01 MG/DL — HIGH (ref 0–0.4)
D DIMER BLD IA.RAPID-MCNC: 1004 NG/ML DDU — HIGH
FERRITIN SERPL-MCNC: 328 NG/ML — HIGH (ref 15–150)
GLUCOSE SERPL-MCNC: 93 MG/DL — SIGNIFICANT CHANGE UP (ref 70–99)
HCT VFR BLD CALC: 37.1 % — SIGNIFICANT CHANGE UP (ref 34.5–45)
HGB BLD-MCNC: 12.1 G/DL — SIGNIFICANT CHANGE UP (ref 11.5–15.5)
MAGNESIUM SERPL-MCNC: 2.1 MG/DL — SIGNIFICANT CHANGE UP (ref 1.6–2.6)
MCHC RBC-ENTMCNC: 27.4 PG — SIGNIFICANT CHANGE UP (ref 27–34)
MCHC RBC-ENTMCNC: 32.6 GM/DL — SIGNIFICANT CHANGE UP (ref 32–36)
MCV RBC AUTO: 83.9 FL — SIGNIFICANT CHANGE UP (ref 80–100)
NRBC # BLD: 0 /100 WBCS — SIGNIFICANT CHANGE UP (ref 0–0)
PHOSPHATE SERPL-MCNC: 3.4 MG/DL — SIGNIFICANT CHANGE UP (ref 2.5–4.5)
PLATELET # BLD AUTO: 169 K/UL — SIGNIFICANT CHANGE UP (ref 150–400)
POTASSIUM SERPL-MCNC: 3.9 MMOL/L — SIGNIFICANT CHANGE UP (ref 3.5–5.3)
POTASSIUM SERPL-SCNC: 3.9 MMOL/L — SIGNIFICANT CHANGE UP (ref 3.5–5.3)
PROT SERPL-MCNC: 6.9 G/DL — SIGNIFICANT CHANGE UP (ref 6–8.3)
RBC # BLD: 4.42 M/UL — SIGNIFICANT CHANGE UP (ref 3.8–5.2)
RBC # FLD: 13.9 % — SIGNIFICANT CHANGE UP (ref 10.3–14.5)
SODIUM SERPL-SCNC: 135 MMOL/L — SIGNIFICANT CHANGE UP (ref 135–145)
WBC # BLD: 7.79 K/UL — SIGNIFICANT CHANGE UP (ref 3.8–10.5)
WBC # FLD AUTO: 7.79 K/UL — SIGNIFICANT CHANGE UP (ref 3.8–10.5)

## 2020-11-19 PROCEDURE — 83605 ASSAY OF LACTIC ACID: CPT

## 2020-11-19 PROCEDURE — 99285 EMERGENCY DEPT VISIT HI MDM: CPT | Mod: 25

## 2020-11-19 PROCEDURE — 83735 ASSAY OF MAGNESIUM: CPT

## 2020-11-19 PROCEDURE — U0003: CPT

## 2020-11-19 PROCEDURE — 87040 BLOOD CULTURE FOR BACTERIA: CPT

## 2020-11-19 PROCEDURE — 82728 ASSAY OF FERRITIN: CPT

## 2020-11-19 PROCEDURE — 84300 ASSAY OF URINE SODIUM: CPT

## 2020-11-19 PROCEDURE — 71275 CT ANGIOGRAPHY CHEST: CPT

## 2020-11-19 PROCEDURE — 93005 ELECTROCARDIOGRAM TRACING: CPT

## 2020-11-19 PROCEDURE — 84100 ASSAY OF PHOSPHORUS: CPT

## 2020-11-19 PROCEDURE — 86140 C-REACTIVE PROTEIN: CPT

## 2020-11-19 PROCEDURE — 36415 COLL VENOUS BLD VENIPUNCTURE: CPT

## 2020-11-19 PROCEDURE — 99239 HOSP IP/OBS DSCHRG MGMT >30: CPT | Mod: CS

## 2020-11-19 PROCEDURE — 84295 ASSAY OF SERUM SODIUM: CPT

## 2020-11-19 PROCEDURE — 82330 ASSAY OF CALCIUM: CPT

## 2020-11-19 PROCEDURE — 85730 THROMBOPLASTIN TIME PARTIAL: CPT

## 2020-11-19 PROCEDURE — 82803 BLOOD GASES ANY COMBINATION: CPT

## 2020-11-19 PROCEDURE — 85379 FIBRIN DEGRADATION QUANT: CPT

## 2020-11-19 PROCEDURE — 85027 COMPLETE CBC AUTOMATED: CPT

## 2020-11-19 PROCEDURE — 85610 PROTHROMBIN TIME: CPT

## 2020-11-19 PROCEDURE — 97161 PT EVAL LOW COMPLEX 20 MIN: CPT

## 2020-11-19 PROCEDURE — 82570 ASSAY OF URINE CREATININE: CPT

## 2020-11-19 PROCEDURE — 84132 ASSAY OF SERUM POTASSIUM: CPT

## 2020-11-19 PROCEDURE — 96374 THER/PROPH/DIAG INJ IV PUSH: CPT

## 2020-11-19 PROCEDURE — 85025 COMPLETE CBC W/AUTO DIFF WBC: CPT

## 2020-11-19 PROCEDURE — 71045 X-RAY EXAM CHEST 1 VIEW: CPT

## 2020-11-19 PROCEDURE — 80053 COMPREHEN METABOLIC PANEL: CPT

## 2020-11-19 PROCEDURE — 84145 PROCALCITONIN (PCT): CPT

## 2020-11-19 RX ORDER — RIVAROXABAN 15 MG-20MG
1 KIT ORAL
Qty: 30 | Refills: 0
Start: 2020-11-19 | End: 2020-12-18

## 2020-11-19 RX ADMIN — ENOXAPARIN SODIUM 40 MILLIGRAM(S): 100 INJECTION SUBCUTANEOUS at 11:31

## 2020-11-19 RX ADMIN — Medication 81 MILLIGRAM(S): at 11:32

## 2020-11-19 RX ADMIN — AMLODIPINE BESYLATE 5 MILLIGRAM(S): 2.5 TABLET ORAL at 06:45

## 2020-11-19 RX ADMIN — PANTOPRAZOLE SODIUM 40 MILLIGRAM(S): 20 TABLET, DELAYED RELEASE ORAL at 06:45

## 2020-11-19 RX ADMIN — Medication 100 MILLIGRAM(S): at 11:32

## 2020-11-19 NOTE — CHART NOTE - NSCHARTNOTEFT_GEN_A_CORE
Given the potential risk of clinically significant arrhythmias in the setting of COVID-19 infection, it is recommended that this patient be followed with a long term heart monitor upon discharge. The duration of monitoring will be 2-4 weeks.

## 2020-11-19 NOTE — PHYSICAL THERAPY INITIAL EVALUATION ADULT - PLANNED THERAPY INTERVENTIONS, PT EVAL
[facility] [place], [place]    HISTORY AND PHYSICAL EXAMINATION    NAME:           SOBEIDA BLAIR:        70  ACCT#:          [id number]       :        1948  MR#:            [id number]       ROOM:  ADMIT DATE:     ï¿½               DIS DATE:  PT TYPE:        D               DP:         409  ATTENDING:      YARI PRYOR MD      DICTATING PHYSICIAN:  YARI PRYOR MD        CHIEF COMPLAINT:    HISTORY OF PRESENT ILLNESS:  The patient is a 70-year-old female, admitted for cataract extraction in the left eye with implant.  The patient had cataract surgery in the right eye in the past with excellent results.  The patient has nuclear sclerotic cataract with decreased vision in the right eye, glare, and has difficulty functioning, poor depth perception, seeing street lights, quality of life issues are present.  Noted improvement in the right eye with a ZCBOO lens.  To undergo left cataract extraction with implant, phacoemulsification, IV sedation, clear corneal temporal topical approach.  Benefits are improved vision, reduced glare.  Risks include, but not limited to infection, hemorrhage, decreased vision, loss of vision, need for more surgery, high pressure, low pressure, vitreous loss, capsular rupture, retained lens material, risk of anesthesia, decreased vision, loss of vision, loss of the eye, refractive surprise, implant dislocation, dysphotopsia, and any risks not listed.  Physician, Dr. Zenon William will dictate history and physical.    PAST MEDICAL HISTORY:  The patient has history of depression, arthritis, and hypertension.    MEDICATIONS:  The patient is presently on alprazolam, omeprazole, Lexapro, benzonatate 200 mg capsule, oxycodone, acetaminophen, meloxicam, ropinirole, trazodone, olmesartan, hydrochlorothiazide, and bupropion.    ALLERGIES:  ALLERGIC TO CELEBREX AND SULFA.    SOCIAL HISTORY:  The patient is not a smoker.  Speaks English.   She drinks alcohol occasionally.  Caffeine 1 cup a day.    FAMILY HISTORY:  Hypertension, cancer, and COPD.    REVIEW OF SYSTEMS:  Noncontributory.    PHYSICAL EXAMINATION:  Vision in the right eye is 20/25, left eye is 20/50 minus.  The patient's pressure is normal in both eyes.  Pupils are equal, round, and reactive to light.  Slit-lamp exam, lids clear.  Conjunctivae noninjected.  Cornea without abnormality.  Deep chamber, both eyes.  Right eye implant in place.  Left eye, 3+ nuclear sclerosis.  A 0.2 cupping in both eyes.  Macula, vessels, and periphery normal.    IMPRESSION:  The patient to undergo cataract extraction in the left eye with implant by phacoemulsification, ZCBOO lens.          Yari Morrison MD        SDR/[facility]  DP:  409  DD:  09/23/2018 20:50:22  DT:  09/24/2018 00:52:00  Job #:  [id number]             Electronically Signed On 09/24/2018 10:07  __________________________________________________   YARI SILVA     balance training/strengthening/transfer training/bed mobility training/gait training/neuromuscular re-education/postural re-education

## 2020-11-19 NOTE — PROGRESS NOTE ADULT - ASSESSMENT
86YO Italian speaking female with history of essential HTN, gout, HLD, CKD3 (baseline ~1.2), CAD s/p PCI admitted for one week of persistent fever, fatigue, and poor oral intake in the setting of COVID19 infection (first COVID+ on Nov 10).    #COVID-19; Positive 11/10  - s/p dex 8mg in ED  - cxray w/ bilateral opacities  - Procal neg , given elevated D Dimer- will Dc on Xarelto , CTPE Neg  #epigastric pain: PPI  #CKD: Cr 1.34 on admission, baseline 1.2  - per PCP baseline from 4189-6016 is 1.2  - no acute intervention  #Coronary disease s/p stent years ago (from son)   - Continue Lipitor 20mg daily, c/w ASA  #HLD (hyperlipidemia)  - C/w Lipitor 20mg daily.  #HTN (hypertension)  - c/w Amlodipine 5mg daily.    #Gout  -C/w Allopurinol 100mg daily.   Dispo: Medically ready for DC home

## 2020-11-19 NOTE — DISCHARGE NOTE NURSING/CASE MANAGEMENT/SOCIAL WORK - PATIENT PORTAL LINK FT
You can access the FollowMyHealth Patient Portal offered by [facility] by registering at the following website: [email or web address]. By joining [facility]’s FollowMyHealth portal, you will also be able to view your health information using other applications (apps) compatible with our system.

## 2020-11-19 NOTE — PHYSICAL THERAPY INITIAL EVALUATION ADULT - GENERAL OBSERVATIONS, REHAB EVAL
Pt found semi supine in bed in NAD, on RA, +hep lock, agreeable to PT Eval, + Sejun ID #[id number], cleared by LASHONDA Zaman.

## 2020-11-19 NOTE — PHYSICAL THERAPY INITIAL EVALUATION ADULT - REFERRING PHYSICIAN, REHAB EVAL
Patient is a 85 year old Luxembourgish speaking female who recently tested positive for COVID-19 who presents with nine days of subjective fevers, shortness of breath, chills, night sweats, with a CXR illustrating bilateral opacities, saturating well on room air and is admitted for further observation of COVID-19.

## 2020-11-19 NOTE — PHYSICAL THERAPY INITIAL EVALUATION ADULT - CRITERIA FOR SKILLED THERAPEUTIC INTERVENTIONS
anticipated discharge recommendation/therapy frequency/anticipated equipment needs at discharge/rehab potential/impairments found/functional limitations in following categories/risk reduction/prevention

## 2020-11-19 NOTE — PROGRESS NOTE ADULT - SUBJECTIVE AND OBJECTIVE BOX
WILLIAN MILNER  85y  Female      Patient is a 85y old  Female who presents with a chief complaint of Weakness (18 Nov 2020 11:31)    Patient states she feels slightly better, still anxious to go home.     PAST MEDICAL/SURGICAL HISTORY  PAST MEDICAL & SURGICAL HISTORY:  COVID-19    HLD (hyperlipidemia)    HTN (hypertension)    Gout    Coronary disease    HLD (hyperlipidemia)    HTN (hypertension)    No significant past surgical history        REVIEW OF SYSTEMS:  CONSTITUTIONAL: No fever, weight loss, or fatigue  EYES: No eye pain, visual disturbances, or discharge  ENMT:  No difficulty hearing, tinnitus, vertigo; No sinus or throat pain  NECK: No pain or stiffness  BREASTS: No pain, masses, or nipple discharge  RESPIRATORY: No cough, wheezing, chills or hemoptysis; No shortness of breath  CARDIOVASCULAR: No chest pain, palpitations, dizziness, or leg swelling  GASTROINTESTINAL: No abdominal or epigastric pain. No nausea, vomiting, or hematemesis; No diarrhea or constipation. No melena or hematochezia.  GENITOURINARY: No dysuria, frequency, hematuria, or incontinence  NEUROLOGICAL: No headaches, memory loss, loss of strength, numbness, or tremors  SKIN: No itching, burning, rashes, or lesions   LYMPH NODES: No enlarged glands  ENDOCRINE: No heat or cold intolerance; No hair loss  MUSCULOSKELETAL: No joint pain or swelling; No muscle, back, or extremity pain  PSYCHIATRIC: No depression, anxiety, mood swings, or difficulty sleeping  HEME/LYMPH: No easy bruising, or bleeding gums  ALLERY AND IMMUNOLOGIC: No hives or eczema    T(C): 37.3 (11-19-20 @ 10:19), Max: 38.2 (11-18-20 @ 22:02)  HR: 56 (11-19-20 @ 10:19) (56 - 56)  BP: 124/61 (11-19-20 @ 10:19) (124/61 - 124/61)  RR: 16 (11-19-20 @ 10:19) (16 - 16)  SpO2: 92% (11-19-20 @ 10:19) (92% - 92%)  Wt(kg): --Vital Signs Last 24 Hrs  T(C): 37.3 (19 Nov 2020 10:19), Max: 38.2 (18 Nov 2020 22:02)  T(F): 99.1 (19 Nov 2020 10:19), Max: 100.8 (18 Nov 2020 22:02)  HR: 56 (19 Nov 2020 10:19) (56 - 56)  BP: 124/61 (19 Nov 2020 10:19) (124/61 - 124/61)  BP(mean): --  RR: 16 (19 Nov 2020 10:19) (16 - 16)  SpO2: 92% (19 Nov 2020 10:19) (92% - 92%)    PHYSICAL EXAM:  GENERAL: NAD, well-groomed, well-developed  HEAD:  Atraumatic, Normocephalic  EYES: EOMI, PERRLA, conjunctiva and sclera clear  ENMT: No tonsillar erythema, exudates, or enlargement; Moist mucous membranes, Good dentition, No lesions  NECK: Supple, No JVD, Normal thyroid  NERVOUS SYSTEM:  Alert & Oriented X3, Good concentration; Motor Strength 5/5 B/L upper and lower extremities; DTRs 2+ intact and symmetric  CHEST/LUNG: Clear to percussion bilaterally; No rales, rhonchi, wheezing, or rubs  HEART: Regular rate and rhythm; No murmurs, rubs, or gallops  ABDOMEN: Soft, Nontender, Nondistended; Bowel sounds present  EXTREMITIES:  2+ Peripheral Pulses, No clubbing, cyanosis, or edema  LYMPH: No lymphadenopathy noted  SKIN: No rashes or lesions    Consultant(s) Notes Reviewed:  [x ] YES  [ ] NO  Care Discussed with Consultants/Other Providers [ x] YES  [ ] NO    LABS:  CBC   11-19-20 @ 08:04  Hematcorit 37.1  Hemoglobin 12.1  Mean Cell Hemoglobin 27.4  Platelet Count-Automated 169  RBC Count 4.42  Red Cell Distrib Width 13.9  Wbc Count 7.79      BMP  11-19-20 @ 08:04  Anion Gap. Serum 12  Blood Urea Nitrogen,Serm 21  Calcium, Total Serum 9.0  Carbon Dioxide, Serum 19  Chloride, Serum 104  Creatinine, Serum 1.28  eGFR in  44  eGFR in Non Afican American 38  Gloucose, serum 93  Potassium, Serum 3.9  Sodium, Serum 135              11-18-20 @ 08:44  Anion Gap. Serum 9  Blood Urea Nitrogen,Serm 22  Calcium, Total Serum 9.6  Carbon Dioxide, Serum 21  Chloride, Serum 111  Creatinine, Serum 1.23  eGFR in  47  eGFR in Non Afican American 40  Gloucose, serum 142  Potassium, Serum 4.4  Sodium, Serum 141              11-17-20 @ 07:52  Anion Gap. Serum 15  Blood Urea Nitrogen,Serm 19  Calcium, Total Serum 9.5  Carbon Dioxide, Serum 20  Chloride, Serum 102  Creatinine, Serum 1.34  eGFR in  42  eGFR in Non Afican American 36  Gloucose, serum 100  Potassium, Serum 4.2  Sodium, Serum 137                  CMP  11-19-20 @ 08:04  [place] Aminotransferase(ALT/SGPT)16  Albumin, Serum 3.5  Alkaline Phosphatase, Serum 58  Anion Gap, Serum 12  Aspartate Aminotransferase (AST/SGOT)28  Bilirubin Total, Serum 0.4  Blood Urea Nitrogen, Serum 21  Calcium,Total Serum 9.0  Carbon Dioxide, Serum 19  Chloride, Serum 104  Creatinine, Serum 1.28  eGFR if  44  eGFR if Non African American 38  Glucose, Serum 93  Potassium, Serum 3.9  Protein Total, Serum 6.9  Sodium, Serum 135                      11-18-20 @ 08:44  [place] Aminotransferase(ALT/SGPT)22  Albumin, Serum 3.9  Alkaline Phosphatase, Serum 62  Anion Gap, Serum 9  Aspartate Aminotransferase (AST/SGOT)29  Bilirubin Total, Serum 0.4  Blood Urea Nitrogen, Serum 22  Calcium,Total Serum 9.6  Carbon Dioxide, Serum 21  Chloride, Serum 111  Creatinine, Serum 1.23  eGFR if  47  eGFR if Non African American 40  Glucose, Serum 142  Potassium, Serum 4.4  Protein Total, Serum 7.4  Sodium, Serum 141                      11-17-20 @ 07:52  [place] Aminotransferase(ALT/SGPT)23  Albumin, Serum 4.0  Alkaline Phosphatase, Serum 61  Anion Gap, Serum 15  Aspartate Aminotransferase (AST/SGOT)44  Bilirubin Total, Serum 0.4  Blood Urea Nitrogen, Serum 19  Calcium,Total Serum 9.5  Carbon Dioxide, Serum 20  Chloride, Serum 102  Creatinine, Serum 1.34  eGFR if  42  eGFR if Non African American 36  Glucose, Serum 100  Potassium, Serum 4.2  Protein Total, Serum 7.9  Sodium, Serum 137                          PT/INR  PT/INR  11-17-20 @ 07:52  INR 0.97  Prothrombin Time Comment --  Prothrobin Time, [id number]      Amylase/Lipase            RADIOLOGY & ADDITIONAL TESTS:    Imaging Personally Reviewed:  [ ] YES  [ ] NO

## 2020-11-19 NOTE — PHYSICAL THERAPY INITIAL EVALUATION ADULT - ASR EQUIP NEEDS DISCH PT EVAL
RW fitted and given to pt, Bethany Lynch CM, Gypsy DISLA and Nirav from [facility] surgical all aware/rolling walker (5 inch wheels)

## 2020-11-19 NOTE — PHYSICAL THERAPY INITIAL EVALUATION ADULT - ADDITIONAL COMMENTS
Pt is Tamaz[place] speaking  used [employer] ID# [id number] and pts son Luigi contacted for PLOF and social hx. Pt lives on the main level of an apartment building with her , 0 NER[address]. Pt reports being independent with all ADL's and functional mobility without use of an assistive device. Pt's Pt is Icelandic speaking  used [employer] ID# [id number] and pt's son Luigi contacted for PLOF and social hx. Pt lives on the main level of an apartment building with her , 0 NERI. Pt reports being independent with all ADL's and functional mobility without use of an assistive device.

## 2020-11-22 LAB
CULTURE RESULTS: SIGNIFICANT CHANGE UP
CULTURE RESULTS: SIGNIFICANT CHANGE UP
SPECIMEN SOURCE: SIGNIFICANT CHANGE UP
SPECIMEN SOURCE: SIGNIFICANT CHANGE UP

## 2020-12-02 DIAGNOSIS — R19.7 DIARRHEA, UNSPECIFIED: ICD-10-CM

## 2020-12-02 DIAGNOSIS — E87.2 ACIDOSIS: ICD-10-CM

## 2020-12-02 DIAGNOSIS — I12.9 HYPERTENSIVE CHRONIC KIDNEY DISEASE WITH STAGE 1 THROUGH STAGE 4 CHRONIC KIDNEY DISEASE, OR UNSPECIFIED CHRONIC KIDNEY DISEASE: ICD-10-CM

## 2020-12-02 DIAGNOSIS — Z88.0 ALLERGY STATUS TO PENICILLIN: ICD-10-CM

## 2020-12-02 DIAGNOSIS — U07.1 COVID-19: ICD-10-CM

## 2020-12-02 DIAGNOSIS — E78.5 HYPERLIPIDEMIA, UNSPECIFIED: ICD-10-CM

## 2020-12-02 DIAGNOSIS — E86.0 DEHYDRATION: ICD-10-CM

## 2020-12-02 DIAGNOSIS — M10.9 GOUT, UNSPECIFIED: ICD-10-CM

## 2020-12-02 DIAGNOSIS — I25.10 ATHEROSCLEROTIC HEART DISEASE OF NATIVE CORONARY ARTERY WITHOUT ANGINA PECTORIS: ICD-10-CM

## 2020-12-02 DIAGNOSIS — N18.30 CHRONIC KIDNEY DISEASE, STAGE 3 UNSPECIFIED: ICD-10-CM

## 2020-12-02 DIAGNOSIS — Z95.5 PRESENCE OF CORONARY ANGIOPLASTY IMPLANT AND GRAFT: ICD-10-CM

## 2020-12-07 PROBLEM — Z00.00 ENCOUNTER FOR PREVENTIVE HEALTH EXAMINATION: Status: ACTIVE | Noted: 2020-12-07

## 2020-12-09 ENCOUNTER — APPOINTMENT (OUTPATIENT)
Dept: HEART AND VASCULAR | Facility: CLINIC | Age: 85
End: 2020-12-09
Payer: MEDICARE

## 2020-12-09 ENCOUNTER — NON-APPOINTMENT (OUTPATIENT)
Age: 85
End: 2020-12-09

## 2020-12-09 VITALS
HEART RATE: 56 BPM | TEMPERATURE: 98.3 F | OXYGEN SATURATION: 99 % | SYSTOLIC BLOOD PRESSURE: 138 MMHG | BODY MASS INDEX: 25.34 KG/M2 | DIASTOLIC BLOOD PRESSURE: 70 MMHG | WEIGHT: 142.99 LBS | HEIGHT: 63 IN

## 2020-12-09 DIAGNOSIS — Z78.9 OTHER SPECIFIED HEALTH STATUS: ICD-10-CM

## 2020-12-09 DIAGNOSIS — Z98.61 ATHEROSCLEROTIC HEART DISEASE OF NATIVE CORONARY ARTERY W/OUT ANGINA PECTORIS: ICD-10-CM

## 2020-12-09 DIAGNOSIS — U07.1 COVID-19: ICD-10-CM

## 2020-12-09 DIAGNOSIS — I25.10 ATHEROSCLEROTIC HEART DISEASE OF NATIVE CORONARY ARTERY W/OUT ANGINA PECTORIS: ICD-10-CM

## 2020-12-09 DIAGNOSIS — I10 ESSENTIAL (PRIMARY) HYPERTENSION: ICD-10-CM

## 2020-12-09 DIAGNOSIS — Z87.39 PERSONAL HISTORY OF OTHER DISEASES OF THE MUSCULOSKELETAL SYSTEM AND CONNECTIVE TISSUE: ICD-10-CM

## 2020-12-09 PROCEDURE — 99203 OFFICE O/P NEW LOW 30 MIN: CPT | Mod: CS

## 2020-12-09 PROCEDURE — 93000 ELECTROCARDIOGRAM COMPLETE: CPT

## 2020-12-09 RX ORDER — RIVAROXABAN 10 MG/1
10 TABLET, FILM COATED ORAL
Refills: 0 | Status: ACTIVE | COMMUNITY

## 2020-12-09 RX ORDER — CALCIUM CARBONATE/VITAMIN D3 600 MG-10
TABLET ORAL
Refills: 0 | Status: ACTIVE | COMMUNITY

## 2020-12-09 RX ORDER — AMLODIPINE BESYLATE 5 MG/1
5 TABLET ORAL
Qty: 90 | Refills: 3 | Status: ACTIVE | COMMUNITY

## 2020-12-09 RX ORDER — ALLOPURINOL 100 MG/1
100 TABLET ORAL DAILY
Qty: 90 | Refills: 0 | Status: ACTIVE | COMMUNITY

## 2020-12-09 RX ORDER — GLUCOSAMINE/CHONDRO SU A 500-400 MG
TABLET ORAL
Refills: 0 | Status: ACTIVE | COMMUNITY

## 2020-12-09 RX ORDER — ROSUVASTATIN CALCIUM 20 MG/1
20 TABLET, FILM COATED ORAL DAILY
Qty: 90 | Refills: 3 | Status: ACTIVE | COMMUNITY

## 2020-12-09 NOTE — PHYSICAL EXAM
[General Appearance - Well Developed] : well developed [Normal Appearance] : normal appearance [Well Groomed] : well groomed [General Appearance - Well Nourished] : well nourished [No Deformities] : no deformities [General Appearance - In No Acute Distress] : no acute distress [Normal Conjunctiva] : the conjunctiva exhibited no abnormalities [Eyelids - No Xanthelasma] : the eyelids demonstrated no xanthelasmas [Normal Oral Mucosa] : normal oral mucosa [No Oral Pallor] : no oral pallor [No Oral Cyanosis] : no oral cyanosis [Normal Jugular Venous A Waves Present] : normal jugular venous A waves present [Normal Jugular Venous V Waves Present] : normal jugular venous V waves present [No Jugular Venous Josue A Waves] : no jugular venous josue A waves [Heart Rate And Rhythm] : heart rate and rhythm were normal [Heart Sounds] : normal S1 and S2 [Murmurs] : no murmurs present [Respiration, Rhythm And Depth] : normal respiratory rhythm and effort [Exaggerated Use Of Accessory Muscles For Inspiration] : no accessory muscle use [Auscultation Breath Sounds / Voice Sounds] : lungs were clear to auscultation bilaterally [Abdomen Soft] : soft [Abdomen Tenderness] : non-tender [Abdomen Mass (___ Cm)] : no abdominal mass palpated [Abnormal Walk] : normal gait [Gait - Sufficient For Exercise Testing] : the gait was sufficient for exercise testing [Nail Clubbing] : no clubbing of the fingernails [Cyanosis, Localized] : no localized cyanosis [Petechial Hemorrhages (___cm)] : no petechial hemorrhages [Skin Color & Pigmentation] : normal skin color and pigmentation [] : no rash [No Venous Stasis] : no venous stasis [Skin Lesions] : no skin lesions [No Skin Ulcers] : no skin ulcer [No Xanthoma] : no  xanthoma was observed [Oriented To Time, Place, And Person] : oriented to person, place, and time [Affect] : the affect was normal [Mood] : the mood was normal [No Anxiety] : not feeling anxious

## 2020-12-09 NOTE — HISTORY OF PRESENT ILLNESS
[FreeTextEntry1] : 85 F referred by [facility] for history of CAD remote stents recent COVID 19 discharged on Xarelto feels well no complaints no cp no sob h/o HTN, GOUT, has no complaints\par \par ecg sb

## 2020-12-09 NOTE — ASSESSMENT
[FreeTextEntry1] : HTN controlled\par CAD on GMT \par COVID she appears well complete xarelto for 30 days\par she should fu with her own cardiologist she has a long standing relationship

## 2024-02-06 NOTE — ED PROVIDER NOTE - OBJECTIVE STATEMENT
Dr. Desai, I had the pleasure of following up with your patient in the cardio-oncology clinic today. Please see the forwarded progress note with recommendations (in red). We will repeat echo this month I added her lipid panel to labs with you later this  month. Please let us know of any treatment changes.   Please contact the cardio oncology clinic: -with any change or delay in chemotherapy plan. -if SBP < 90 or > 130 and/or HR >100 or <55. -if patient develops heart failure symptoms or arrhythmias. -if patient needs additional cardio oncology follow-up -any questions or concerns.  Alvina Gacria RN on behalf of MO[facility]iera -[phone]   86 y/o F mostly Armenian speaking translation provided by LASHONDA Hylton w/ PMHx HTN, HLD, known +COVID dx 11/10/20 presents to the ED c/o weakness, fever, body aches, mild diarrhea since 11/07/20 on her tenth day of sx. Has had significantly decreased PO over the past wk and has had no appetite which primarily drove her to come in, son called EMS. Denies cough, CP, SOB, n/v or any other acute sx. Has been taking Tylenol for fever.  had recovered from COVID prior to her getting sick and has been doing well at home. No tobacco.

## 2024-08-19 NOTE — ED ADULT NURSE NOTE - ED CARDIAC CAPILLARY REFILL
[de-identified] : Indication: Patellofemoral arthritis right knee   Consent: At this time, I have recommended an injection to the right knee.  The risks and benefits of the procedure were discussed with the patient in detail.  Upon verbal consent of the patient, we proceeded with the injection as noted below.   Description of Procedure: After a sterile prep, the patient underwent an injection of approximately 9 mL of 1% Lidocaine (10 mg/mL) without epinephrine and 1 mL of triamcinolone acetonide (40 mg/mL) into the right knee.  The patient tolerated the procedure well.  There were no complications.   :  [employer] Drug Name:  Triamcinolone Acetonide Injectable Suspension USP NCD#:  [id number] Lot#:  [id number] Expiration Date:  08/31/2025   2 seconds or less

## 2025-04-28 ENCOUNTER — OFFICE (OUTPATIENT)
Facility: LOCATION | Age: 89
Setting detail: OPHTHALMOLOGY
End: 2025-04-28
Payer: MEDICARE

## 2025-04-28 DIAGNOSIS — H40.013: ICD-10-CM

## 2025-04-28 DIAGNOSIS — H25.11: ICD-10-CM

## 2025-04-28 DIAGNOSIS — H43.393: ICD-10-CM

## 2025-04-28 DIAGNOSIS — H25.13: ICD-10-CM

## 2025-04-28 PROBLEM — H16.223 DRY EYE SYNDROME K SICCA; BOTH EYES: Status: ACTIVE | Noted: 2025-04-28

## 2025-04-28 PROBLEM — H53.021 AMBLYOPIA REFRACTIVE; RIGHT EYE: Status: ACTIVE | Noted: 2025-04-28

## 2025-04-28 PROCEDURE — 92014 COMPRE OPH EXAM EST PT 1/>: CPT | Performed by: OPHTHALMOLOGY

## 2025-04-28 PROCEDURE — 92136 OPHTHALMIC BIOMETRY: CPT | Mod: TC | Performed by: OPHTHALMOLOGY

## 2025-04-28 PROCEDURE — 92136 OPHTHALMIC BIOMETRY: CPT | Mod: RT | Performed by: OPHTHALMOLOGY

## 2025-04-28 PROCEDURE — 92133 CPTRZD OPH DX IMG PST SGM ON: CPT | Performed by: OPHTHALMOLOGY

## 2025-04-28 PROCEDURE — 92202 OPSCPY EXTND ON/MAC DRAW: CPT | Performed by: OPHTHALMOLOGY

## 2025-04-28 ASSESSMENT — DECREASING TEAR LAKE - SEVERITY SCORE
OS_DEC_TEARLAKE: 2+
OD_DEC_TEARLAKE: 2+

## 2025-04-28 ASSESSMENT — CONFRONTATIONAL VISUAL FIELD TEST (CVF)
OS_FINDINGS: FULL
OD_FINDINGS: FULL

## 2025-04-28 ASSESSMENT — REFRACTION_AUTOREFRACTION
OD_CYLINDER: -3.75
OD_AXIS: 075
OD_SPHERE: -8.50
OS_CYLINDER: -1.25
OS_SPHERE: -2.00
OS_AXIS: 073

## 2025-04-28 ASSESSMENT — KERATOMETRY
OS_AXISANGLE_DEGREES: 63
OS_K2POWER_DIOPTERS: 46.00
OD_AXISANGLE_DEGREES: 82
OS_CYLPOWER_DEGREES: 1.25
OD_K1POWER_DIOPTERS: 45.50
OD_K2POWER_DIOPTERS: 46.00
OD_K2POWER_DIOPTERS: 46.00
OD_CYLPOWER_DEGREES: 0.5
OS_CYLAXISANGLE_DEGREES: 153
OS_AXISANGLE2_DEGREES: 153
OS_K1K2_AVERAGE: 45.375
OD_K1POWER_DIOPTERS: 45.50
OD_CYLAXISANGLE_DEGREES: 172
OD_AXISANGLE2_DEGREES: 172
OD_AXISANGLE_DEGREES: 172
OS_AXISANGLE_DEGREES: 153
OS_K1POWER_DIOPTERS: 44.75
OD_K1K2_AVERAGE: 45.75
OS_K2POWER_DIOPTERS: 46.00
OS_K1POWER_DIOPTERS: 44.75

## 2025-04-28 ASSESSMENT — REFRACTION_MANIFEST
OS_AXIS: 073
OD_VA1: 20/80-1
OD_SPHERE: -8.50
OD_VA1: 20/80-1
OD_CYLINDER: -3.75
OS_SPHERE: -2.00
OD_AXIS: 075
OD_CYLINDER: -3.75
OS_SPHERE: -2.00
OS_CYLINDER: -1.25
OS_AXIS: 090
OS_VA1: 20/40+1
OS_VA1: 20/40+1
OD_SPHERE: -8.50
OS_CYLINDER: -1.25
OD_AXIS: 090

## 2025-04-28 ASSESSMENT — REFRACTION_CURRENTRX
OS_OVR_VA: 20/
OD_SPHERE: -6.50
OD_CYLINDER: -0.75
OS_ADD: +0.50
OS_AXIS: 180
OS_CYLINDER: SPH
OD_ADD: +0.50
OD_AXIS: 064
OS_SPHERE: -3.00
OD_OVR_VA: 20/

## 2025-04-28 ASSESSMENT — VISUAL ACUITY
OS_BCVA: CF
OD_BCVA: 20/150

## 2025-04-28 ASSESSMENT — TONOMETRY
OS_IOP_MMHG: 19
OD_IOP_MMHG: 10

## 2025-05-22 ENCOUNTER — APPOINTMENT (OUTPATIENT)
Dept: CARDIOLOGY | Facility: CLINIC | Age: 89
End: 2025-05-22
Payer: MEDICARE

## 2025-05-22 VITALS
RESPIRATION RATE: 16 BRPM | DIASTOLIC BLOOD PRESSURE: 75 MMHG | HEART RATE: 51 BPM | OXYGEN SATURATION: 99 % | SYSTOLIC BLOOD PRESSURE: 171 MMHG | BODY MASS INDEX: 29.06 KG/M2 | HEIGHT: 63 IN | WEIGHT: 164 LBS

## 2025-05-22 PROCEDURE — G2211 COMPLEX E/M VISIT ADD ON: CPT

## 2025-05-22 PROCEDURE — 99204 OFFICE O/P NEW MOD 45 MIN: CPT

## 2025-05-22 PROCEDURE — 93000 ELECTROCARDIOGRAM COMPLETE: CPT

## 2025-05-22 RX ORDER — AMLODIPINE BESYLATE 10 MG/1
10 TABLET ORAL DAILY
Qty: 90 | Refills: 3 | Status: ACTIVE | COMMUNITY
Start: 2025-05-22 | End: 1900-01-01

## 2025-06-03 ENCOUNTER — APPOINTMENT (OUTPATIENT)
Dept: HEART AND VASCULAR | Facility: CLINIC | Age: 89
End: 2025-06-03

## 2025-06-19 NOTE — ED PROVIDER NOTE - BIRTH SEX
CBC ordered by another provider indicates mild anemia. Orders placed for CBC and iron studies.      BMT Chart Routing      Follow up with physician    Follow up with LIZZETTE No follow up needed.   Provider visit type    Infusion scheduling note   NA   Injection scheduling note NA   Labs Ferritin and iron and TIBC   Scheduling:  Preferred lab:  Lab interval:  Labs [facility]   NA   Pharmacy appointment No pharmacy appointment needed      Other referrals No nutrition appointment needed -  No referral to Oncology Primary Care needed -  No massage appointment needed    No additional referrals needed            
Female

## 2025-08-19 ENCOUNTER — APPOINTMENT (OUTPATIENT)
Dept: HEART AND VASCULAR | Facility: CLINIC | Age: 89
End: 2025-08-19
Payer: MEDICARE

## 2025-08-19 VITALS
OXYGEN SATURATION: 98 % | HEIGHT: 63 IN | HEART RATE: 59 BPM | BODY MASS INDEX: 28.88 KG/M2 | DIASTOLIC BLOOD PRESSURE: 60 MMHG | WEIGHT: 162.99 LBS | TEMPERATURE: 98.2 F | SYSTOLIC BLOOD PRESSURE: 150 MMHG

## 2025-08-19 PROCEDURE — 99215 OFFICE O/P EST HI 40 MIN: CPT

## 2025-08-19 PROCEDURE — 93000 ELECTROCARDIOGRAM COMPLETE: CPT

## 2025-08-19 PROCEDURE — G2211 COMPLEX E/M VISIT ADD ON: CPT

## 2025-08-19 RX ORDER — LOSARTAN POTASSIUM 50 MG/1
50 TABLET, FILM COATED ORAL DAILY
Qty: 90 | Refills: 3 | Status: ACTIVE | COMMUNITY
Start: 2025-08-19 | End: 1900-01-01

## 2025-08-19 RX ORDER — HYDRALAZINE HYDROCHLORIDE 50 MG/1
50 TABLET ORAL TWICE DAILY
Qty: 180 | Refills: 0 | Status: ACTIVE | COMMUNITY
Start: 2025-08-19 | End: 1900-01-01